# Patient Record
Sex: FEMALE | Race: WHITE | Employment: FULL TIME | URBAN - METROPOLITAN AREA
[De-identification: names, ages, dates, MRNs, and addresses within clinical notes are randomized per-mention and may not be internally consistent; named-entity substitution may affect disease eponyms.]

---

## 2018-03-07 LAB — PAP SMEAR - HIM PATIENT REPORTED: NEGATIVE

## 2019-08-14 LAB
CREAT SERPL-MCNC: 0.68 MG/DL (ref 0.55–1.02)
GFR SERPL CREATININE-BSD FRML MDRD: >60 ML/MIN/1.73M2
GLUCOSE SERPL-MCNC: 170 MG/DL (ref 70–100)
POTASSIUM SERPL-SCNC: 4.5 MMOL/L (ref 3.5–5.1)

## 2019-10-01 ENCOUNTER — TRANSFERRED RECORDS (OUTPATIENT)
Dept: HEALTH INFORMATION MANAGEMENT | Facility: CLINIC | Age: 54
End: 2019-10-01

## 2019-10-01 LAB — RETINOPATHY: NORMAL

## 2019-11-11 LAB
ALBUMIN (URINE) MG/SPEC: 10.6 MG/L
ALBUMIN/CREATININE RATIO: 20 MG/G
CHOLEST SERPL-MCNC: 190 MG/DL (ref 0–199)
CREATININE (URINE): 53 MG/DL
HBA1C MFR BLD: 6.6 % (ref 0–5.7)
HDLC SERPL-MCNC: 50 MG/DL
LDLC SERPL CALC-MCNC: 124 MG/DL
NONHDLC SERPL-MCNC: 140 MG/DL
TRIGL SERPL-MCNC: 81 MG/DL

## 2020-01-08 ENCOUNTER — HOSPITAL ENCOUNTER (EMERGENCY)
Facility: CLINIC | Age: 55
Discharge: HOME | End: 2020-01-08
Attending: EMERGENCY MEDICINE | Admitting: EMERGENCY MEDICINE
Payer: COMMERCIAL

## 2020-01-08 ENCOUNTER — APPOINTMENT (OUTPATIENT)
Dept: ULTRASOUND IMAGING | Facility: CLINIC | Age: 55
End: 2020-01-08
Attending: NURSE PRACTITIONER
Payer: COMMERCIAL

## 2020-01-08 VITALS
HEIGHT: 65 IN | WEIGHT: 210 LBS | TEMPERATURE: 97.9 F | DIASTOLIC BLOOD PRESSURE: 79 MMHG | RESPIRATION RATE: 18 BRPM | HEART RATE: 84 BPM | SYSTOLIC BLOOD PRESSURE: 126 MMHG | OXYGEN SATURATION: 99 % | BODY MASS INDEX: 34.99 KG/M2

## 2020-01-08 DIAGNOSIS — B37.31 YEAST INFECTION OF THE VAGINA: ICD-10-CM

## 2020-01-08 DIAGNOSIS — B35.1 ONYCHOMYCOSIS OF LEFT GREAT TOE: ICD-10-CM

## 2020-01-08 DIAGNOSIS — L03.90 CELLULITIS: ICD-10-CM

## 2020-01-08 PROCEDURE — 93971 EXTREMITY STUDY: CPT | Mod: LT

## 2020-01-08 PROCEDURE — 99284 EMERGENCY DEPT VISIT MOD MDM: CPT | Mod: 25

## 2020-01-08 RX ORDER — CLOTRIMAZOLE 1 %
CREAM (GRAM) TOPICAL 2 TIMES DAILY
Qty: 45 G | Refills: 0 | Status: SHIPPED | OUTPATIENT
Start: 2020-01-08 | End: 2020-03-23

## 2020-01-08 RX ORDER — DOXYCYCLINE 100 MG/1
100 CAPSULE ORAL 2 TIMES DAILY
Qty: 14 CAPSULE | Refills: 0 | Status: SHIPPED | OUTPATIENT
Start: 2020-01-08 | End: 2020-03-23

## 2020-01-08 RX ORDER — FLUCONAZOLE 150 MG/1
TABLET ORAL
Qty: 2 TABLET | Refills: 0 | Status: SHIPPED | OUTPATIENT
Start: 2020-01-08 | End: 2020-03-23

## 2020-01-08 RX ORDER — DOXYCYCLINE 100 MG/1
100 CAPSULE ORAL 2 TIMES DAILY
COMMUNITY
End: 2020-01-08

## 2020-01-08 RX ORDER — INSULIN GLARGINE 100 [IU]/ML
INJECTION, SOLUTION SUBCUTANEOUS DAILY
COMMUNITY

## 2020-01-08 ASSESSMENT — ENCOUNTER SYMPTOMS
CHILLS: 0
NUMBNESS: 0
EYE PAIN: 0
COUGH: 0
LIGHT-HEADEDNESS: 0
WOUND: 1
WHEEZING: 0
VOMITING: 0
DIFFICULTY URINATING: 0
WEAKNESS: 0
SORE THROAT: 0
NAUSEA: 0
DIZZINESS: 0
HEADACHES: 0
FATIGUE: 0
DIARRHEA: 0
ABDOMINAL PAIN: 0
PALPITATIONS: 0
SHORTNESS OF BREATH: 0
FEVER: 0
DYSURIA: 0

## 2020-01-08 ASSESSMENT — MIFFLIN-ST. JEOR: SCORE: 1563.43

## 2020-01-08 NOTE — ED AVS SNAPSHOT
Emergency Department  [address] [place] [address]  Phone:  [phone]  Fax:  [phone]                                    Tyra Shin   MRN: [id number]    Department:   Emergency Department   Date of Visit:  1/8/2020           After Visit Summary Signature Page    I have received my discharge instructions, and my questions have been answered. I have discussed any challenges I see with this plan with the nurse or doctor.    ..........................................................................................................................................  Patient/Patient Representative Signature      ..........................................................................................................................................  Patient Representative Print Name and Relationship to Patient    ..................................................               ................................................  Date                                   Time    ..........................................................................................................................................  Reviewed by Signature/Title    ...................................................              ..............................................  Date                                               Time          22EPIC Rev 08/18

## 2020-01-08 NOTE — ED PROVIDER NOTES
Emergency Department Attending Supervision Note  1/8/2020  5:06 PM      I evaluated this patient in conjunction with ZION Paz      Briefly, the patient presented with toe pain. The patient has had issues with her great toe on her left foot for the past 2 months and today presents with a diabetic ulcer and infection on the second toe of the left foot that began a month ago. She is currently being treated with doxycycline and Kelfex and states that the toe has improved. She presents today with concern about the changing color of the large toe nail on her left foot and calf pain.       On my exam,   Vitals: reviewed by me  General: Pt seen on [facility], pleasant, cooperative, and alert to conversation  Eyes: Tracking well, clear conjunctiva BL  ENT: MMM, midline trachea.   Lungs:   No tachypnea, no accessory muscle use. No respiratory distress.   CV: Rate as above, regular rhythm.    MSK: no peripheral edema, her left leg is nontender to palpation, does have blood in the subungual space, minimal, dried appearing.  No evidence of chao onychomycoses as yet, minimal erythema surrounding the nailbed of digit 1 and digit 2 in her left foot.  No purulence seen, no drainage, no fluctuance.  This erythema and redness does not extend into the distal foot.  Closed ulcer on the medial aspect of her great toe is closed and callused.  Appears well-healed.  Skin: No rash, normal turgor and temperature  Neuro: Clear speech and no facial droop.  Psych: Not RIS, no e/o AH/VH      1810 I visited and evaluated the patient after discussing them with EVELIN Paz following her evaluation.    1850 I rechecked the patient just prior to their discharge.     Impression  Patient presents to ER with calf pain, and out of concern for her left toe, second digit. DVT is unremarkable, no blood clot noted, no evidence of cellulitis. She is on an antibiotic for what appears to be a chronic cellulitis of her left foot,  complicated by an ulcer that appears to be healing. I do not think that she needs IV antibiotics. I will not change outpatient management, will give a continued prescription for doxycycline for 7 days here, so that she can see her regular doctor Monday or Tuesday of next week, she has been on this antibiotic since November, and knows the importance of following with her regular care doctor.       Diagnosis    ICD-10-CM    1. Onychomycosis of left great toe B35.1          Thuan Burgos*          Thuan Burgos MD  01/08/20 2035

## 2020-01-08 NOTE — DISCHARGE INSTRUCTIONS
Follow-up with 1 of the referrals given to establish primary care, wound care, and podiatry this week.  Continue your antibiotics as prescribed.  Return to emergency department for increased or new concerning symptoms.      Discharge Instructions  Cellulitis    Cellulitis is an infection of the skin that occurs when bacteria enter the skin.   Symptoms are generally redness, swelling, warmth and pain.  Your infection appeared to be appropriate to treat at home with antibiotics.  However, sometimes your infection may be worse than it seemed at first, or may worsen with time. If you have new or worse symptoms, you may need to be seen again in the Emergency Department or by your primary provider.    Generally, every Emergency Department visit should have a follow-up clinic visit with either a primary or a specialty clinic/provider. Please follow-up as instructed by your emergency provider today.    Return to the Emergency Department if:  The redness, pain, or swelling gets a lot worse.  If the red area was marked, return if it is red significantly beyond the marked area.  You are unable to get your antibiotics, or are vomiting (throwing up) these pills, or you cannot take them.  You are feeling more ill, weak or lightheaded.  You start to run a new fever (temperature >101 F).  Anything else about the infection worries or concerns you.  Treatment:  Start your antibiotics right away, and take them as prescribed. Be sure to finish the whole prescription, even if you are better.  Apply a heating pad, warm packs, or warm water soaks to the infected area for 15 minutes at a time, at least 3 times a day. Do not use a heating pad on your feet or legs if you have diabetes. Do not sleep with a heating pad on, since this can cause burns or skin injury.  Rest your injured area for at least 1-2 days. After that you may start using your extremity again as long as there is not too much pain.   Raise the injured area above the level of  your heart as much as possible in the first 1-2 days.  Tylenol  (acetaminophen), Motrin  (ibuprofen), or Advil  (ibuprofen) may help may help reduce pain and fever and may help you feel more comfortable. Be sure to read and follow the package directions, and ask your provider if you have questions.    If you were given a prescription for medicine here today, be sure to read all of the information (including the package insert) that comes with your prescription.  This will include important information about the medicine, its side effects, and any warnings that you need to know about.  The pharmacist who fills the prescription can provide more information and answer questions you may have about the medicine.  If you have questions or concerns that the pharmacist cannot address, please call or return to the Emergency Department.     Remember that you can always come back to the Emergency Department if you are not able to see your regular provider in the amount of time listed above, if you get any new symptoms, or if there is anything that worries you.

## 2020-01-09 ENCOUNTER — TRANSFERRED RECORDS (OUTPATIENT)
Dept: HEALTH INFORMATION MANAGEMENT | Facility: CLINIC | Age: 55
End: 2020-01-09

## 2020-01-15 ENCOUNTER — HOSPITAL ENCOUNTER (OUTPATIENT)
Dept: WOUND CARE | Facility: CLINIC | Age: 55
Discharge: HOME | End: 2020-01-15
Attending: PHYSICIAN ASSISTANT | Admitting: PHYSICIAN ASSISTANT
Payer: COMMERCIAL

## 2020-01-15 ENCOUNTER — HOSPITAL ENCOUNTER (OUTPATIENT)
Dept: GENERAL RADIOLOGY | Facility: CLINIC | Age: 55
End: 2020-01-15
Attending: PHYSICIAN ASSISTANT
Payer: COMMERCIAL

## 2020-01-15 VITALS
WEIGHT: 210 LBS | HEIGHT: 65 IN | HEART RATE: 87 BPM | SYSTOLIC BLOOD PRESSURE: 140 MMHG | RESPIRATION RATE: 19 BRPM | BODY MASS INDEX: 34.99 KG/M2 | TEMPERATURE: 98.2 F | DIASTOLIC BLOOD PRESSURE: 76 MMHG

## 2020-01-15 DIAGNOSIS — L89.893 PRESSURE INJURY OF TOE OF LEFT FOOT, STAGE 3 (H): ICD-10-CM

## 2020-01-15 DIAGNOSIS — E11.21 TYPE 2 DIABETES MELLITUS WITH DIABETIC NEPHROPATHY (H): Primary | ICD-10-CM

## 2020-01-15 PROCEDURE — 73630 X-RAY EXAM OF FOOT: CPT | Mod: LT

## 2020-01-15 PROCEDURE — 11042 DBRDMT SUBQ TIS 1ST 20SQCM/<: CPT

## 2020-01-15 PROCEDURE — A6261 WOUND FILLER GEL/PASTE /OZ: HCPCS

## 2020-01-15 PROCEDURE — G0463 HOSPITAL OUTPT CLINIC VISIT: HCPCS | Mod: 25

## 2020-01-15 PROCEDURE — 99203 OFFICE O/P NEW LOW 30 MIN: CPT | Mod: 25 | Performed by: PHYSICIAN ASSISTANT

## 2020-01-15 PROCEDURE — 11042 DBRDMT SUBQ TIS 1ST 20SQCM/<: CPT | Performed by: PHYSICIAN ASSISTANT

## 2020-01-15 RX ORDER — NYSTATIN AND TRIAMCINOLONE ACETONIDE 100000; 1 [USP'U]/G; MG/G
OINTMENT TOPICAL
COMMUNITY
Start: 2018-05-21 | End: 2020-03-23

## 2020-01-15 RX ORDER — ONDANSETRON 4 MG/1
TABLET, ORALLY DISINTEGRATING ORAL
COMMUNITY
Start: 2019-08-20 | End: 2020-03-23

## 2020-01-15 RX ORDER — PEN NEEDLE, DIABETIC 31 GX5/16"
NEEDLE, DISPOSABLE MISCELLANEOUS
COMMUNITY
Start: 2019-10-31

## 2020-01-15 RX ORDER — ACETAMINOPHEN 325 MG/1
650 TABLET ORAL
COMMUNITY

## 2020-01-15 RX ORDER — AZITHROMYCIN 250 MG/1
TABLET, FILM COATED ORAL
COMMUNITY
Start: 2019-05-19 | End: 2020-03-23

## 2020-01-15 RX ORDER — BLOOD-GLUCOSE METER
EACH MISCELLANEOUS
COMMUNITY
Start: 2017-02-14

## 2020-01-15 RX ORDER — ONDANSETRON 8 MG/1
TABLET, ORALLY DISINTEGRATING ORAL
COMMUNITY
Start: 2019-03-20

## 2020-01-15 RX ORDER — ONDANSETRON 8 MG/1
8 TABLET, ORALLY DISINTEGRATING ORAL
COMMUNITY
Start: 2019-03-20

## 2020-01-15 RX ORDER — CHLORAL HYDRATE 500 MG
CAPSULE ORAL EVERY 24 HOURS
COMMUNITY
Start: 2010-03-08

## 2020-01-15 RX ORDER — HYDROCODONE BITARTRATE AND ACETAMINOPHEN 5; 325 MG/1; MG/1
1-2 TABLET ORAL EVERY 6 HOURS PRN
COMMUNITY
Start: 2019-01-16 | End: 2020-03-23

## 2020-01-15 RX ORDER — LANCETS
EACH MISCELLANEOUS
COMMUNITY
Start: 2019-08-19

## 2020-01-15 RX ORDER — PRIMIDONE 50 MG/1
TABLET ORAL
COMMUNITY
Start: 2019-08-19 | End: 2020-03-23

## 2020-01-15 RX ORDER — MULTIVIT,CALC,MINS/IRON/FOLIC 500-18-0.4
TABLET ORAL
COMMUNITY
Start: 2009-05-18

## 2020-01-15 RX ORDER — BLOOD SUGAR DIAGNOSTIC
STRIP MISCELLANEOUS
COMMUNITY
Start: 2019-11-15

## 2020-01-15 RX ORDER — LORAZEPAM 0.5 MG/1
TABLET ORAL
COMMUNITY
Start: 2019-08-20 | End: 2020-03-23

## 2020-01-15 RX ORDER — LANCETS 30 GAUGE
1 EACH MISCELLANEOUS
COMMUNITY
Start: 2019-08-21

## 2020-01-15 RX ORDER — BENZONATATE 100 MG/1
CAPSULE ORAL
COMMUNITY
Start: 2019-05-19 | End: 2020-03-23

## 2020-01-15 RX ORDER — DOXYCYCLINE 100 MG/1
CAPSULE ORAL
COMMUNITY
Start: 2019-12-27 | End: 2020-03-23

## 2020-01-15 RX ORDER — ERGOCALCIFEROL 1.25 MG/1
CAPSULE, LIQUID FILLED ORAL
COMMUNITY
Start: 2018-05-08

## 2020-01-15 RX ORDER — BETAMETHASONE DIPROPIONATE 0.5 MG/G
LOTION TOPICAL
COMMUNITY
Start: 2019-03-18

## 2020-01-15 ASSESSMENT — MIFFLIN-ST. JEOR: SCORE: 1563.43

## 2020-01-15 NOTE — DISCHARGE INSTRUCTIONS
[facility]  [address] [address]    Call us at [phone] if you have any questions about your wounds, have redness or swelling around your wound, have a fever of 101 or greater or if you have any other problems or concerns. We answer the phone Monday through Friday 8 am to 4 pm, please leave a message as we check the voicemail frequently throughout the day.     Tyra Shin      1965    Dressing change to left 2nd toe  Cleanse wound with soap and water in the shower, apply Iodosorb to wound bed, cover with a Band-Aid and change daily     Brielle Friend PA-C. January 15, 2020    Appointments for you to make:  Wound Healing Clinic follow up with Tracee Friend PA-C in 2 weeks [phone]  Orthotics ([facility]): [phone]  Imaging: Please call [facility] [phone] ([address] [place]) to schedule your left foot xray appointment if you have not heard from them in two business days. Arrive 15 minutes early. If you need to cancel or change the appointment please call [facility] [phone] ([address] [facility] [address] [place])

## 2020-01-15 NOTE — PROGRESS NOTES
Patient arrived for wound care visit. Certified Wound Care Nurse time spent evaluating patient record, completed a full evaluation and documented wound(s) & giana-wound skin; provided recommendation based on treatment plan. Applied dressing, reviewed discharge instructions, patient education, and discussed plan of care with appropriate medical team staff members and patient and/or family members.

## 2020-02-06 ENCOUNTER — HOSPITAL ENCOUNTER (OUTPATIENT)
Dept: WOUND CARE | Facility: CLINIC | Age: 55
Discharge: HOME | End: 2020-02-06
Attending: PHYSICIAN ASSISTANT | Admitting: PHYSICIAN ASSISTANT
Payer: COMMERCIAL

## 2020-02-06 VITALS
TEMPERATURE: 97.9 F | RESPIRATION RATE: 18 BRPM | HEART RATE: 83 BPM | DIASTOLIC BLOOD PRESSURE: 68 MMHG | SYSTOLIC BLOOD PRESSURE: 133 MMHG

## 2020-02-06 DIAGNOSIS — L89.893 PRESSURE INJURY OF TOE OF LEFT FOOT, STAGE 3 (H): ICD-10-CM

## 2020-02-06 PROCEDURE — G0463 HOSPITAL OUTPT CLINIC VISIT: HCPCS

## 2020-02-06 PROCEDURE — 99213 OFFICE O/P EST LOW 20 MIN: CPT | Performed by: PHYSICIAN ASSISTANT

## 2020-02-06 NOTE — DISCHARGE INSTRUCTIONS
[facility]  [address] [facility] [address]  Appointment Phone [phone]     Tyra Kip      1965    Your wound is healed!! Dressings are no longer required on the left 2nd toe but recommend a bandaid on the back of your right heel where your shoe is rubbing. Put a pillow under your calf while you are sleeping to keep pressure off of your heel.     Brielle Friend PA-C. February 6, 2020    Call us at [phone] if you have any questions about your wounds, have redness or swelling around your wound, have a fever of 101 or greater or if you have any other problems or concerns. We answer the phone Monday through Friday 8 am to 4 pm, please leave a message as we check the voicemail frequently throughout the day.     Follow up with our office as needed

## 2020-02-06 NOTE — PROGRESS NOTES
[facility]    HISTORY OF PRESENT ILLNESS:   Tyra Shin is a 54 year old female who returns today for a left second toe wound. Tyra is diabetic, however under excellent control. 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 History of previous ulcer on dorsal left first toe which has now healed. Concerned that she now has subungual hematoma on the nail of first toe. Wonders if she had some trauma on first and second toes from new orthotics causing rubbing in her shoe.     INTERVAL HISTORY:    Wound is healed    Fitted for new orthotics and shoes through [facility]    Has new blister on right heel    CURRENT/PREVIOUS DRESSING: iodosorb    COMPRESSION/OFFLOADING: Darco shoe, has custom orthotics made at [facility] at the end of last year     DATE WOUND ACQUIRED: ~12/25/19    REVIEW OF SYSTEMS:  CONSTITUTIONAL: Denies fevers or acute illness  CARDIOVASCULAR: Denies peripheral vascular disease, clotting disorders or previous peripheral vascular procedures  ENDOCRINE: Diabetes under excellent control    PAST MEDICAL HISTORY:  has a past medical history of Diabetes (H), Polycystic kidney disease, and Sleep apnea.    SOCIAL HISTORY: manager at [employer]  TOBACCO STATUS:  reports that she has never smoked. She does not have any smokeless tobacco history on file.    MEDICATIONS:   Current Outpatient Medications   Medication     ACCU-CHEK LEONIE PLUS test strip     acetaminophen (TYLENOL) 325 MG tablet     aspirin (ASA) 81 MG tablet     azithromycin (ZITHROMAX) 250 MG tablet     B-D U/F 31G X 8 MM insulin pen needle     benzonatate (TESSALON) 100 MG capsule     betamethasone dipropionate (DIPROSONE) 0.05 % external lotion     blood glucose (NO BRAND SPECIFIED) test strip     blood glucose (ONETOUCH ULTRA) test strip     blood glucose monitoring (ONE TOUCH ULTRA 2) meter device kit     blood glucose monitoring (ONE TOUCH ULTRASOFT) lancets     Calcium Carbonate-Vit D-Min (CALCIUM 1200 PO)      cephALEXin (KEFLEX) 250 MG capsule     doxycycline monohydrate (MONODOX) 100 MG capsule     dulaglutide (TRULICITY) 0.75 MG/0.5ML pen     fish oil-omega-3 fatty acids 1000 MG capsule     HYDROcodone-acetaminophen (NORCO) 5-325 MG tablet     insulin glargine (BASAGLAR KWIKPEN) 100 UNIT/ML pen     insulin pen needle (B-D U/F) 31G X 8 MM miscellaneous     Lancets MISC     LORazepam (ATIVAN) 0.5 MG tablet     Multiple Vitamins-Calcium (ONE-A-DAY WOMENS FORMULA) TABS     nystatin-triamcinolone (MYCOLOG) 100262-5.1 UNIT/GM-% external ointment     ondansetron (ZOFRAN-ODT) 4 MG ODT tab     ondansetron (ZOFRAN-ODT) 8 MG ODT tab     ondansetron (ZOFRAN-ODT) 8 MG ODT tab     order for DME     primidone (MYSOLINE) 50 MG tablet     vitamin C (ASCORBIC ACID) 100 MG tablet     vitamin D2 (ERGOCALCIFEROL) 58799 units (1250 mcg) capsule     No current facility-administered medications for this encounter.      VITALS: /68   Pulse 83   Temp 97.9  F (36.6  C) (Temporal)   Resp 18      PHYSICAL EXAM:  GENERAL: Patient is alert and oriented and in no acute distress  CV: 2+ DP and PT pulses  INTEGUMENTARY: intact blister on right heel, dried exudate over scar tissue on left second toe      ASSESSMENT:   1. Healed, Stage 4 pressure ulcer of left second toe  2. Stage 2 pressure ulcer of right heel    PLAN/DISCUSSION:   1. Monitor feet closely, wear appropriate shoewear  2. Discontinue dressings on toe  3. Float heel in bed and utilize bandaid/moleskin if friction from shoes    FOLLOW-UP: LINDSEY MOORE PA-C

## 2020-02-11 ENCOUNTER — MEDICAL CORRESPONDENCE (OUTPATIENT)
Dept: HEALTH INFORMATION MANAGEMENT | Facility: CLINIC | Age: 55
End: 2020-02-11

## 2020-03-11 ENCOUNTER — OFFICE VISIT (OUTPATIENT)
Dept: FAMILY MEDICINE | Facility: CLINIC | Age: 55
End: 2020-03-11
Payer: COMMERCIAL

## 2020-03-11 VITALS
WEIGHT: 215 LBS | BODY MASS INDEX: 35.78 KG/M2 | SYSTOLIC BLOOD PRESSURE: 124 MMHG | DIASTOLIC BLOOD PRESSURE: 70 MMHG | TEMPERATURE: 97.7 F | HEART RATE: 78 BPM | OXYGEN SATURATION: 98 %

## 2020-03-11 DIAGNOSIS — A08.4 VIRAL GASTROENTERITIS: Primary | ICD-10-CM

## 2020-03-11 DIAGNOSIS — R19.7 DIARRHEA OF PRESUMED INFECTIOUS ORIGIN: ICD-10-CM

## 2020-03-11 DIAGNOSIS — R11.0 NAUSEA: ICD-10-CM

## 2020-03-11 PROCEDURE — 99203 OFFICE O/P NEW LOW 30 MIN: CPT | Performed by: NURSE PRACTITIONER

## 2020-03-11 RX ORDER — ONDANSETRON 4 MG/1
4 TABLET, ORALLY DISINTEGRATING ORAL EVERY 8 HOURS PRN
Qty: 16 TABLET | Refills: 0 | Status: SHIPPED | OUTPATIENT
Start: 2020-03-11 | End: 2020-03-23

## 2020-03-11 SDOH — HEALTH STABILITY: MENTAL HEALTH: HOW OFTEN DO YOU HAVE A DRINK CONTAINING ALCOHOL?: MONTHLY OR LESS

## 2020-03-11 NOTE — PROGRESS NOTES
Subjective     Tyra Shin is a 54 year old female who presents to clinic today for the following health issues:      Acute Illness   Acute illness concerns: nausea, body aches  Onset: onset 7am this morning    Fever: no    Chills/Sweats: no    Headache (location?): no    Sinus Pressure:no    Conjunctivitis:  no    Ear Pain: no    Rhinorrhea: no    Congestion: no    Sore Throat: no     Cough: no    Wheeze: no    Decreased Appetite: no    Nausea: YES    Vomiting: no    Diarrhea:  no    Dysuria/Freq.: no    Fatigue/Achiness: YES    Sick/Strep Exposure: YES- daughter with stomach flu     Therapies Tried and outcome: NONE    HPI: Last night, she developed body aches. Of note, her daughter had presumed viral GE yesterday. On her way to work this morning, Tyra developed nausea (no vomiting). Three episodes of diarrhea after she arrived at work. Has not vomited, however. Denies weakness but endorses fatigue. No blood in stool or mucous. Has not tested BG this morning (has controlled DM2). Denies respiratory symptoms.       Patient Active Problem List   Diagnosis   (none) - all problems resolved or deleted     Past Surgical History:   Procedure Laterality Date     GI SURGERY       GYN SURGERY       ORTHOPEDIC SURGERY         Social History     Tobacco Use     Smoking status: Never Smoker     Smokeless tobacco: Never Used   Substance Use Topics     Alcohol use: Yes     Frequency: Monthly or less     History reviewed. No pertinent family history.      Current Outpatient Medications   Medication Sig Dispense Refill     acetaminophen (TYLENOL) 325 MG tablet Take 650 mg by mouth       aspirin (ASA) 81 MG tablet Take 1 tablet by mouth every 24 hours       betamethasone dipropionate (DIPROSONE) 0.05 % external lotion APPLY TO SCALP TWICE DAILY FOR UP TO 7 DAYS       Calcium Carbonate-Vit D-Min (CALCIUM 1200 PO)        dulaglutide (TRULICITY) 0.75 MG/0.5ML pen Inject 0.75 mg Subcutaneous every 7 days       fish oil-omega-3  fatty acids 1000 MG capsule every 24 hours       insulin glargine (BASAGLAR KWIKPEN) 100 UNIT/ML pen Inject Subcutaneous daily       LORazepam (ATIVAN) 0.5 MG tablet TAKE 1 TABLET BY MOUTH 3 TIMES DAILY AS NEEDED FOR DIZZINESS       Multiple Vitamins-Calcium (ONE-A-DAY WOMENS FORMULA) TABS Indications: PN:       nystatin-triamcinolone (MYCOLOG) 404063-0.1 UNIT/GM-% external ointment Apply topically twice a day for 1-2 wks       ondansetron (ZOFRAN-ODT) 4 MG ODT tab Take 1 tablet (4 mg) by mouth every 8 hours as needed for nausea 16 tablet 0     order for DME [facility]  Eileen:Phone: [phone] Fax: [phone]    Please call patient to schedule evaluate and treat as indicated for diabetic shoes and inserts to offload left 2nd dorsal to wound 1 Device 0     primidone (MYSOLINE) 50 MG tablet TAKE 1 TABLET BY MOUTH EVERYDAY AT BEDTIME       vitamin C (ASCORBIC ACID) 100 MG tablet        vitamin D2 (ERGOCALCIFEROL) 56547 units (1250 mcg) capsule TAKE 1 CAPSULE BY MOUTH ONCE EVERY WEEK       ACCU-CHEK LEONIE PLUS test strip USE TO TEST 6 TO 7 TIMES PER DAY       azithromycin (ZITHROMAX) 250 MG tablet TAKE 2 TABLETS BY MOUTH TODAY, THEN TAKE 1 TABLET DAILY FOR 4 DAYS       B-D U/F 31G X 8 MM insulin pen needle INJECT SUBCUTANEOUSLY 4 TIMES DAILY.       benzonatate (TESSALON) 100 MG capsule TAKE 1 CAPSULE BY MOUTH 3 TIMES DAILY AS NEEDED FOR COUGH FOR UP TO 7 DAYS       blood glucose (NO BRAND SPECIFIED) test strip USE TO TEST 6 TO 7 TIMES PER DAY       blood glucose (ONETOUCH ULTRA) test strip Use  to test as needed for Blood Sugar >.       blood glucose monitoring (ONE TOUCH ULTRA 2) meter device kit Use  to test 4 times a day.       blood glucose monitoring (ONE TOUCH ULTRASOFT) lancets USE 1 EACH TO TEST 4 TIMES A DAY BEFORE MEALS.       cephALEXin (KEFLEX) 250 MG capsule Take 250 mg by mouth 4 times daily       doxycycline monohydrate (MONODOX) 100 MG capsule TAKE 1 CAPSULE BY MOUTH TWO TIMES  A DAY FOR 10 DAYS.       HYDROcodone-acetaminophen (NORCO) 5-325 MG tablet Take 1-2 tablets by mouth every 6 hours as needed       insulin pen needle (B-D U/F) 31G X 8 MM miscellaneous INJECT SUBCUTANEOUSLY 4 TIMES DAILY.       Lancets MISC 1 each       ondansetron (ZOFRAN-ODT) 4 MG ODT tab DISSOLVE 1 TABLET ON THE TONGUE 3 TIMES DAILY AS NEEDED FOR NAUSEA       ondansetron (ZOFRAN-ODT) 8 MG ODT tab Take 8 mg by mouth       ondansetron (ZOFRAN-ODT) 8 MG ODT tab TAKE 1 TABLET BY MOUTH EVERY 8 HOURS AS NEEDED FOR NAUSEA       Allergies   Allergen Reactions     Meperidine GI Disturbance     Atorvastatin      Codeine      Darvocet [Propoxyphene N-Apap]      Metformin      Oxycodone      Primidone      Sulfa Drugs      Tramadol      No lab results found.   BP Readings from Last 3 Encounters:   03/11/20 124/70   02/06/20 133/68   01/15/20 (!) 140/76    Wt Readings from Last 3 Encounters:   03/11/20 97.5 kg (215 lb)   01/15/20 95.3 kg (210 lb)   01/08/20 95.3 kg (210 lb)           Reviewed and updated as needed this visit by Provider  Tobacco  Allergies  Meds  Problems  Med Hx  Surg Hx  Fam Hx         Review of Systems   ROS COMP: Constitutional, HEENT, pulmonary, GI, musculoskeletal systems are negative, except as otherwise noted.      Objective    /70   Pulse 78   Temp 97.7  F (36.5  C) (Tympanic)   Wt 97.5 kg (215 lb)   LMP 01/01/2015   SpO2 98%   BMI 35.78 kg/m    Body mass index is 35.78 kg/m .  Physical Exam   GENERAL: healthy, alert and no distress  NECK: no adenopathy, no asymmetry, masses, or scars and thyroid normal to palpation  RESP: lungs clear to auscultation - no rales, rhonchi or wheezes  CV: regular rate and rhythm, normal S1 S2, no S3 or S4, no murmur, click or rub, no peripheral edema and peripheral pulses strong  ABDOMEN: soft, no hepatosplenomegaly, no masses; hyperactive bowel sounds; Mild TTP over LUQ and LLQ; Negative Cohen sign and Rovsing sign; No guarding, rigidity, rebound  "tenderness.  NEURO: Normal strength and tone, mentation intact and speech normal; Left arm / hand weakness and tremor (baseline)    Diagnostic Test Results:  Labs reviewed in Epic  No results found for this or any previous visit (from the past 24 hour(s)).        Assessment & Plan     Tyra was seen today for nausea.    Diagnoses and all orders for this visit:    Viral gastroenteritis  Comment: No red flags to suggest intra-abdominal catastrophe or other serious pathology (ie diverticulitis, gall bladder attack, etc.). Can maintain hydration so far. Likely the same process from which her daughter has been suffering (whether viral GE or food borne illness). Anticipate gradual recovery with supportive cares and rest. Suggested BRAT diet, electrolyte-rich hydration, OTC anti-diarrheal (judicious use), attention to BGs (with instruction to call if dramatically higher than usual), and ondansetron ODT for nausea. Discussed reasons to call or return to clinic. Tyra acknowledges and demonstrates understanding of circumstances under which care should be sought urgently or emergently. Follow up as discussed. Discussed risks, benefits, alternatives, potential side effects, and proper administration of new medication / treatment. Agrees with plan of care. All questions answered.     Nausea  -     ondansetron (ZOFRAN-ODT) 4 MG ODT tab; Take 1 tablet (4 mg) by mouth every 8 hours as needed for nausea    Diarrhea of presumed infectious origin  Comment: Rest, hydration, bland diet, anti-diarrheal (sparing use). Follow up precautions discussed.          BMI:   Estimated body mass index is 35.78 kg/m  as calculated from the following:    Height as of 1/15/20: 1.651 m (5' 5\").    Weight as of this encounter: 97.5 kg (215 lb).   Weight management plan: Discussed healthy diet and exercise guidelines        See Patient Instructions    Return in about 3 days (around 3/14/2020) for persistent or worsening symptoms.    Edy JULIAN" ZION Max  [facility]

## 2020-03-11 NOTE — PATIENT INSTRUCTIONS
1. Keep diet bland (BRAT - bananas, rice, apples, toast / tea)  2. Stay hydrated - electrolytes are important  3. Can use OTC anti-diarrheas, if needed  4. Watch BG

## 2020-03-11 NOTE — LETTER
[facility]  [address] [address]  Phone: [phone]    March 11, 2020        Tyra Shin  [address] [place] [address]          To whom it may concern:    RE: Tyra Shin    Patient was seen and treated today at our clinic and missed work. Please excuse her absence due to illness. She will be able to return to work once her symptoms have been resolved for 24 hours.     Please contact me for questions or concerns.      Sincerely,        Edy Max NP

## 2020-03-11 NOTE — Clinical Note
Please abstract the following data from this visit with this patient into the appropriate field in Epic:    Tests that can be patient reported without a hard copy:    Colonoscopy done on this date: 2017 (approximately), by this group: [facility], results were Normal. , Mammogram done on this date: 8/2019 (approximately), by this group: [facility], results were Normal. , Pap smear done on this date: 3/7/18 (approximately), by this group: [facility], results were Normal. , and Eye exam with ophthalmology on this date: 10/2019 [facility]    Other Tests found in the patient's chart through Chart Review/Care Everywhere:    {Abstract Quality List (Optional):786350}    Note to Abstraction: If this section is blank, no results were found via Chart Review/Care Everywhere.

## 2020-03-23 ENCOUNTER — VIRTUAL VISIT (OUTPATIENT)
Dept: FAMILY MEDICINE | Facility: CLINIC | Age: 55
End: 2020-03-23
Payer: COMMERCIAL

## 2020-03-23 ENCOUNTER — NURSE TRIAGE (OUTPATIENT)
Dept: NURSING | Facility: CLINIC | Age: 55
End: 2020-03-23

## 2020-03-23 ENCOUNTER — TELEPHONE (OUTPATIENT)
Dept: FAMILY MEDICINE | Facility: CLINIC | Age: 55
End: 2020-03-23

## 2020-03-23 DIAGNOSIS — J01.90 ACUTE SINUSITIS WITH SYMPTOMS > 10 DAYS: Primary | ICD-10-CM

## 2020-03-23 DIAGNOSIS — Z71.89 ADVICE GIVEN ABOUT COVID-19 VIRUS INFECTION: ICD-10-CM

## 2020-03-23 PROCEDURE — 99442 ZZC PHYSICIAN TELEPHONE EVALUATION 11-20 MIN: CPT | Performed by: NURSE PRACTITIONER

## 2020-03-23 NOTE — TELEPHONE ENCOUNTER
Letter has been faxed.    .Linda HUMMEL    [facility] [place]

## 2020-03-23 NOTE — TELEPHONE ENCOUNTER
Patient calling back with fax number: [phone] Attention: Rita HUMMEL    [facility] [place]

## 2020-03-23 NOTE — TELEPHONE ENCOUNTER
Using Vicks vaporub on chest, gargling with salt water and mouth wash. Temp around 97.3 blood sugar 188 after eating lunch.  I connected her with scheduling for a telephone visit.  Christina Rivera RN-[facility]      Additional Information    Negative: Severe difficulty breathing (e.g., struggling for each breath, speaks in single words)    Negative: Sounds like a life-threatening emergency to the triager    Negative: Throat culture results, call about    Negative: Productive cough is the main symptom    Negative: Runny nose is the main symptom    Negative: Drooling or spitting out saliva (because can't swallow)    Negative: Unable to open mouth completely    Negative: Drinking very little and has signs of dehydration (e.g., no urine > 12 hours, very dry mouth, very lightheaded)    Negative: Patient sounds very sick or weak to the triager    Negative: Difficulty breathing (per caller) but not severe    Negative: Fever > 103 F (39.4 C)    Negative: Refuses to drink anything for > 12 hours    SEVERE sore throat pain     Pain at 8.5    Protocols used: SORE THROAT-A-OH

## 2020-03-23 NOTE — LETTER
[facility] [place]  [address] [address]  Phone: [phone]    March 23, 2020        Tyra Shin  [address] [address]          To whom it may concern:    RE: Tyra Mary is under my professional care. She developed upper respiratory symptoms on Saturday evening that could be consistent with the COVID-19 illness. Because of the widespread community transmission noted in this area, we are assuming that everyone with these symptoms could be infected. Formal testing is not available for the general public on an outpatient basis at this time, so we are advising all patients presenting in this manner to self-quarantine. We recommend not leaving home for 7 days after symptom onset (and 3 days beyond resolution of upper respiratory symptoms even if longer than 7 days after onset). Please excuse her work absence accordingly.     Please contact me for questions or concerns.      Sincerely,        Edy Max NP

## 2020-03-23 NOTE — TELEPHONE ENCOUNTER
I am expecting a call back from Tyra with a fax number, so I can forward a letter to her employer. Please route this to me after she calls. Thank you very much.     Timoteo Max

## 2020-03-23 NOTE — PROGRESS NOTES
"Tyra Shin is a 54 year old female who is being evaluated via a billable telephone visit.      The patient has been notified of following:     \"This telephone visit will be conducted via a call between you and your physician/provider. We have found that certain health care needs can be provided without the need for a physical exam.  This service lets us provide the care you need with a short phone conversation.  If a prescription is necessary we can send it directly to your pharmacy.  If lab work is needed we can place an order for that and you can then stop by our lab to have the test done at a later time.    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    Meghna Mccrary, CMA    ALLERGIES  Meperidine; Atorvastatin; Codeine; Darvocet [propoxyphene n-apap]; Metformin; Oxycodone; Primidone; Sulfa drugs; and Tramadol    Additional provider notes:     HPI: Tyra calls today with the complaints of sore throat, sinus pressure / pain, bilateral ear pain, thick / bloody nasal discharge, and body aches. She also endorses cough (dry). She denies fever, chills, wheeze, SOB. No known exposures to anyone with suspected or confirmed COVID-19. She has diabetes and relates that her BG has been stable (though slightly elevated) within this illness setting. She works in the service industry (manager at a restaurant). She developed these symptoms over the weekend, and they have gotten worse.     Assessment/Plan:  1. Acute sinusitis with symptoms > 10 days  Comment: Unlikely to be strep pharyngitis based on " symptoms. Also unlikely to be influenza (no fever). No suggestion of pneumonia or other potentially-invasive respiratory process. Given statements of bloody nasal discharge, sore throat, and ear pain, it is likely that she is suffering with a bacterially-driven sinus infection. Will treat with Augmentin for 7 days. Symptomatic cares and follow up precautions discussed in detail (see below regarding education / guidance provided related to COVID-19). Discussed risks, benefits, alternatives, potential side effects, and proper administration of new medication / treatment. Agrees with plan of care. All questions answered.   - amoxicillin-clavulanate (AUGMENTIN) 875-125 MG tablet; Take 1 tablet by mouth 2 times daily for 7 days  Dispense: 14 tablet; Refill: 0    2. Advice Given About Covid-19 Virus Infection  Comment: We discussed the fact that because she has cough, we cannot confidently rule out COVID-19 (especially over the phone). Because of the widespread community transmission of the virus causing COVID-19, we assume that anyone with these symptoms could have been infected. Thus, I have recommended that she remain in self-quarantine for 7 days (or at least 3 days beyond resolution of her cough). I have written a letter to her employer letting him/her that Tyra has been advised to comply with these recommendations. No perceived threat of respiratory distress at this time, but we discussed red flag symptoms that should prompt presentation to the ED (after calling ahead).       Phone call duration:  18 minutes      Edy Max NP

## 2020-03-30 ENCOUNTER — VIRTUAL VISIT (OUTPATIENT)
Dept: FAMILY MEDICINE | Facility: CLINIC | Age: 55
End: 2020-03-30
Payer: COMMERCIAL

## 2020-03-30 DIAGNOSIS — Z71.89 ADVICE GIVEN ABOUT COVID-19 VIRUS INFECTION: ICD-10-CM

## 2020-03-30 DIAGNOSIS — J06.9 UPPER RESPIRATORY TRACT INFECTION, UNSPECIFIED TYPE: Primary | ICD-10-CM

## 2020-03-30 PROCEDURE — 99207 ZZC NO BILLABLE SERVICE THIS VISIT: CPT | Performed by: NURSE PRACTITIONER

## 2020-03-30 NOTE — PATIENT INSTRUCTIONS
Letter will be faxed to your     Let me know if things change / worsen over the next few days    Continue symptomatic cares (OTC, rest, hydration)

## 2020-03-30 NOTE — PROGRESS NOTES
"Subjective     Tyar Shin is a 54 year old female who is being evaluated via a billable telephone visit.      The patient has been notified of following:     \"This telephone visit will be conducted via a call between you and your physician/provider. We have found that certain health care needs can be provided without the need for a physical exam.  This service lets us provide the care you need with a short phone conversation.  If a prescription is necessary we can send it directly to your pharmacy.  If lab work is needed we can place an order for that and you can then stop by our lab to have the test done at a later time.    If during the course of the call the physician/provider feels a telephone visit is not appropriate, you will not be charged for this service.\"     Physician has received verbal consent for a Telephone Visit from the patient? Yes    Tyra Shin complains of   Chief Complaint   Patient presents with     Follow Up     Concern - Follow up on symptoms from last telephone visit       Description:   Requesting work release     ALLERGIES  Meperidine; Atorvastatin; Codeine; Darvocet [propoxyphene n-apap]; Metformin; Oxycodone; Primidone; Sulfa drugs; and Tramadol    HPI: Tyra presents today for follow up of respiratory symptoms. One week ago, Tyra participated in a telephone visit during which she complained of sore throat, sinus pressure / pain, bilateral ear pain, thick / bloody nasal discharge, body aches, and dry cough. She denied fever, chills, wheeze, SOB. Her diabetes remained fairly well managed. She does work in the  industry. After her visit last week, a note was sent via fax to her employer with the recommendation that she stay home from work for at least 7 days (and three days beyond resolution of symptoms) per the CDC guidelines related to potential COVID-19 illnesses. She was also treated for potential bacterially-driven sinusitis. She relates today that she is " "feeling better than she had been at the time of her last visit, but she continues to have a sore throat, muscle aches, a mild cough, mild nausea, and left ear pain. No SOB, wheeze. Her BGs have normalized as she recovers, reassuringly. Her manager would like her to remain out for the next week or so, but he needs a letter verifying that she was \"seen\" virtually today.     Patient Active Problem List   Diagnosis   (none) - all problems resolved or deleted     Past Surgical History:   Procedure Laterality Date     GI SURGERY       GYN SURGERY       ORTHOPEDIC SURGERY         Social History     Tobacco Use     Smoking status: Never Smoker     Smokeless tobacco: Never Used   Substance Use Topics     Alcohol use: Yes     Frequency: Monthly or less     History reviewed. No pertinent family history.        Reviewed and updated as needed this visit by Provider  Tobacco  Allergies  Meds  Problems  Med Hx  Surg Hx  Fam Hx         Review of Systems   ROS COMP: Constitutional, HEENT, pulmonary, GI, musculoskeletal, endocrine systems are negative, except as otherwise noted.       Objective   Reported vitals:  [facility] 01/01/2015    alert, no distress and cooperative  Psych: Alert and oriented times 3; coherent speech, normal   rate and volume, able to articulate logical thoughts, able   to abstract reason, no tangential thoughts, no hallucinations   or delusions  Her affect is bright / appropriate     Diagnostic Test Results:  Labs reviewed in Epic        Assessment/Plan:  1. Upper respiratory tract infection, unspecified type  Comment: Symptoms are improving but she still does not meet criteria for return to work. No red flags from a respiratory standpoint and her DM appears to be well-managed within the setting of this illness. Letter written to recommend staying out of work for another 7 days. Recommended continuing symptomatic cares (rest, hydration, OTC agents). Discussed reasons to call or return to clinic. Tyra" acknowledges and demonstrates understanding of circumstances under which care should be sought urgently or emergently. Follow up as discussed.    2. Advice Given About Covid-19 Virus Infection  Comment: Reinforced the fact that all individuals suffering with respiratory symptoms are assumed to potentially be infected with the SARS-CoV-2 virus and that CDC recommendations remain in play (isolation / quarantine until three days have passed since last symptomatic). She agrees.       Return in about 1 week (around 4/6/2020) for persistent or worsening symptoms.      Phone call duration:  11 minutes    Edy Max NP

## 2021-01-13 ENCOUNTER — PATIENT OUTREACH (OUTPATIENT)
Dept: FAMILY MEDICINE | Facility: CLINIC | Age: 56
End: 2021-01-13

## 2021-01-13 NOTE — TELEPHONE ENCOUNTER
Please call Tyra and let her know that she is due for diabetes check (with fasting labs).     Thanks,    Timoteo